# Patient Record
Sex: FEMALE | Race: WHITE
[De-identification: names, ages, dates, MRNs, and addresses within clinical notes are randomized per-mention and may not be internally consistent; named-entity substitution may affect disease eponyms.]

---

## 2017-12-19 NOTE — RAD
TWO VIEWS CHEST

12/19/17

 

PROVIDED CLINICAL HISTORY:  

Fever.

 

FINDINGS:  

Comparison 7/4/16.

 

The cardiac and mediastinal silhouette is within normal limits. No focal consolidation, pleural fluid
, or pneumothorax apparent. Degenerative changes are seen involving the thoracic spine. 

 

IMPRESSION:  

No evidence for an acute cardiopulmonary process. 

 

POS: [facility]

## 2018-10-14 ENCOUNTER — HOSPITAL ENCOUNTER (EMERGENCY)
Dept: HOSPITAL 9 - MADERS | Age: 53
Discharge: HOME | End: 2018-10-14
Payer: SELF-PAY

## 2018-10-14 ENCOUNTER — HOSPITAL ENCOUNTER (EMERGENCY)
Dept: HOSPITAL 92 - ERS | Age: 53
Discharge: HOME | End: 2018-10-14
Payer: SELF-PAY
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
## 2018-10-14 DIAGNOSIS — I10: ICD-10-CM

## 2018-10-14 DIAGNOSIS — S83.92XA: ICD-10-CM

## 2018-10-14 DIAGNOSIS — F41.9: ICD-10-CM
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
## 2018-10-14 DIAGNOSIS — T21.21XA: Primary | ICD-10-CM

## 2018-10-14 DIAGNOSIS — E11.9: ICD-10-CM

## 2018-10-14 DIAGNOSIS — M25.562: ICD-10-CM

## 2018-10-14 DIAGNOSIS — F32.9: ICD-10-CM

## 2018-10-14 DIAGNOSIS — G89.29: ICD-10-CM

## 2018-10-14 DIAGNOSIS — Z79.899: ICD-10-CM

## 2018-10-14 DIAGNOSIS — J45.909: ICD-10-CM

## 2018-10-14 DIAGNOSIS — Z79.84: ICD-10-CM

## 2018-10-14 DIAGNOSIS — E78.00: ICD-10-CM
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
## 2018-10-14 DIAGNOSIS — F17.210: ICD-10-CM

## 2018-10-14 PROCEDURE — 99283 EMERGENCY DEPT VISIT LOW MDM: CPT

## 2018-10-14 PROCEDURE — 16020 DRESS/DEBRID P-THICK BURN S: CPT
